# Patient Record
Sex: MALE | Race: WHITE | NOT HISPANIC OR LATINO | Employment: OTHER | URBAN - METROPOLITAN AREA
[De-identification: names, ages, dates, MRNs, and addresses within clinical notes are randomized per-mention and may not be internally consistent; named-entity substitution may affect disease eponyms.]

---

## 2017-12-19 ENCOUNTER — APPOINTMENT (OUTPATIENT)
Dept: PREADMISSION TESTING | Facility: HOSPITAL | Age: 62
End: 2017-12-19

## 2017-12-19 LAB
ANION GAP SERPL CALCULATED.3IONS-SCNC: 8 MMOL/L (ref 3–11)
BUN BLD-MCNC: 14 MG/DL (ref 9–23)
BUN/CREAT SERPL: 11.7 (ref 7–25)
CALCIUM SPEC-SCNC: 9.2 MG/DL (ref 8.7–10.4)
CHLORIDE SERPL-SCNC: 106 MMOL/L (ref 99–109)
CO2 SERPL-SCNC: 31 MMOL/L (ref 20–31)
CREAT BLD-MCNC: 1.2 MG/DL (ref 0.6–1.3)
DEPRECATED RDW RBC AUTO: 44 FL (ref 37–54)
ERYTHROCYTE [DISTWIDTH] IN BLOOD BY AUTOMATED COUNT: 12.7 % (ref 11.3–14.5)
GFR SERPL CREATININE-BSD FRML MDRD: 61 ML/MIN/1.73
GLUCOSE BLD-MCNC: 100 MG/DL (ref 70–100)
HCT VFR BLD AUTO: 42.9 % (ref 38.9–50.9)
HGB BLD-MCNC: 14.4 G/DL (ref 13.1–17.5)
MCH RBC QN AUTO: 31.6 PG (ref 27–31)
MCHC RBC AUTO-ENTMCNC: 33.6 G/DL (ref 32–36)
MCV RBC AUTO: 94.3 FL (ref 80–99)
PLATELET # BLD AUTO: 217 10*3/MM3 (ref 150–450)
PMV BLD AUTO: 10.2 FL (ref 6–12)
POTASSIUM BLD-SCNC: 4.3 MMOL/L (ref 3.5–5.5)
RBC # BLD AUTO: 4.55 10*6/MM3 (ref 4.2–5.76)
SODIUM BLD-SCNC: 145 MMOL/L (ref 132–146)
WBC NRBC COR # BLD: 5.6 10*3/MM3 (ref 3.5–10.8)

## 2017-12-19 PROCEDURE — 93005 ELECTROCARDIOGRAM TRACING: CPT

## 2017-12-19 PROCEDURE — 93010 ELECTROCARDIOGRAM REPORT: CPT | Performed by: INTERNAL MEDICINE

## 2017-12-19 PROCEDURE — 36415 COLL VENOUS BLD VENIPUNCTURE: CPT

## 2017-12-19 PROCEDURE — 85027 COMPLETE CBC AUTOMATED: CPT | Performed by: SURGERY

## 2017-12-19 PROCEDURE — 80048 BASIC METABOLIC PNL TOTAL CA: CPT | Performed by: SURGERY

## 2019-08-05 ENCOUNTER — CONSULT (OUTPATIENT)
Dept: SLEEP MEDICINE | Facility: HOSPITAL | Age: 64
End: 2019-08-05

## 2019-08-05 VITALS
HEIGHT: 74 IN | BODY MASS INDEX: 33.65 KG/M2 | SYSTOLIC BLOOD PRESSURE: 151 MMHG | WEIGHT: 262.2 LBS | OXYGEN SATURATION: 94 % | DIASTOLIC BLOOD PRESSURE: 74 MMHG | HEART RATE: 51 BPM

## 2019-08-05 DIAGNOSIS — F51.12 BEHAVIORALLY INDUCED INSUFFICIENT SLEEP SYNDROME: ICD-10-CM

## 2019-08-05 DIAGNOSIS — G47.10 HYPERSOMNOLENCE: Primary | ICD-10-CM

## 2019-08-05 DIAGNOSIS — E66.09 CLASS 1 OBESITY DUE TO EXCESS CALORIES WITH SERIOUS COMORBIDITY AND BODY MASS INDEX (BMI) OF 33.0 TO 33.9 IN ADULT: ICD-10-CM

## 2019-08-05 DIAGNOSIS — R06.83 SNORING: ICD-10-CM

## 2019-08-05 PROBLEM — I10 ESSENTIAL HYPERTENSION: Status: ACTIVE | Noted: 2019-08-05

## 2019-08-05 PROBLEM — I49.3 PVC (PREMATURE VENTRICULAR CONTRACTION): Status: ACTIVE | Noted: 2019-08-05

## 2019-08-05 PROCEDURE — 99204 OFFICE O/P NEW MOD 45 MIN: CPT | Performed by: INTERNAL MEDICINE

## 2019-08-05 RX ORDER — FLECAINIDE ACETATE 150 MG/1
75 TABLET ORAL 2 TIMES DAILY
Refills: 0 | COMMUNITY
Start: 2019-06-04

## 2019-08-05 RX ORDER — SIMVASTATIN 40 MG
40 TABLET ORAL DAILY
Refills: 1 | COMMUNITY
Start: 2019-07-04

## 2019-08-05 RX ORDER — DICLOFENAC SODIUM 75 MG/1
75 TABLET, DELAYED RELEASE ORAL 2 TIMES DAILY
Refills: 1 | COMMUNITY
Start: 2019-06-17 | End: 2020-11-18

## 2019-08-05 RX ORDER — METOPROLOL TARTRATE 100 MG/1
TABLET ORAL
Refills: 1 | COMMUNITY
Start: 2019-06-17

## 2019-08-05 NOTE — PROGRESS NOTES
New Sleep Patient Office Visit      Patient Name: Jonny Kim    Referring Physician: Missael Charles MD    Chief Complaint:    Chief Complaint   Patient presents with   • Sleeping Problem, snoring, excessive sleepiness       History of Present Illness: Jonny Kim is a 63 y.o. male who is here today to establish care with Sleep Medicine with history of snoring, witnessed apneas and excessive sleepiness    [place] Scale: 15/24    Estimated average amount of sleep per night: 5  Number of times  he wakes up at night: 3  Difficulty falling back asleep: no  It usually takes 10 minutes to go to sleep.  He feels sleepy upon waking up: yes  Rotating or night shift work: no    Drowsiness/Sleepiness:  He exhibits the following:  excessive daytime sleepiness  excessive daytime fatigue  falls asleep during times of the day when he is quiet  difficulty driving due to sleepiness  sleepy even while on vacation    Snoring/Breathing:  He exhibits the following:  loud snoring  awoken with dry mouth  trouble breathing through nose at night    Reflux:  He describes the following:  eats 3 meals daily     Narcolepsy:  No symptoms suggestive.     RLS/PLMs:  none    Insomnia:  He describes the following:  restless sleep    Parasomnia:  none    Weight:  Weight change in the last year:None, stable    Subjective      Pleasant 63-year-old male presenting here for initial evaluation.  Patient has past medical history of hypertension and premature ventricular contractions for which she is on medications.  He states that medications are working and things are under good control for him.  His blood pressure however is somewhat elevated today.  He tells me he is having problem with the sleep for long time.  He snores loudly, stops breathing at night according to wife and wake up restless and not restful sleep overnight.  It has been going on for long time and finally patient decided to undergo further evaluation.  Does wake up with a dry  mouth in the morning.  Goes to bed around 1030 and wake up time is around 430.  Does not take long to fall asleep but does wake up 3 times to go to the restroom.  Wakes up with dry mouth and has to drink water.  Denies any morning headaches however.  He goes to work very early and states that sometimes when he is driving to work or coming back from work he finds himself nodding off.  He brings down the temperature in the car and then he can be more awake.  Patient denies any restless leg syndrome night.  Denies any REM behavior disorder phenomena kicking or hitting during sleep or acting out his dreams.  Denies any symptoms suggestive of narcolepsy.  Does have occasional edema.  No nocturnal heartburn symptoms.  States that snoring is worse in supine sleep.  Patient denies any caffeine intake during the daytime either.  Denies any orthopnea or PND symptoms.  No recent weight gain or weight loss either.  Denies any accidents or near misses while driving.  Strongly counseled to be safe on the road and if he is sleepy then will over take a nap before driving on.  He verbalized understanding.  Patient does feel quite fatigued and sleepy and sometimes catches himself napping at work.  States that when he is unable to finish his work that increases his anxiety.      Review of Systems:   Review of Systems   Constitutional: Positive for fatigue. Negative for appetite change, chills, diaphoresis, fever, unexpected weight gain and unexpected weight loss.   HENT: Positive for congestion (occasional) and tinnitus. Negative for sinus pressure, trouble swallowing and voice change.    Eyes: Negative.    Respiratory: Negative.    Cardiovascular: Positive for palpitations. Negative for chest pain and leg swelling.   Endocrine: Negative.    Musculoskeletal: Positive for myalgias. Negative for arthralgias, back pain and joint swelling.   Skin: Negative.    Neurological: Negative.    Psychiatric/Behavioral: Negative.    All other  "systems reviewed and are negative.      Past Medical History:   Past Medical History:   Diagnosis Date   • Hyperlipidemia    • Hypertension    • PVC (premature ventricular contraction)        Past Surgical History:   Past Surgical History:   Procedure Laterality Date   • HERNIA REPAIR         Family History:   Family History   Problem Relation Age of Onset   • Hypertension Father    • Cancer Father         colon   • Hypertension Brother    • Obesity Brother    • Hypertension Brother    • Obesity Brother        Social History:   Social History     Socioeconomic History   • Marital status:      Spouse name: Not on file   • Number of children: Not on file   • Years of education: Not on file   • Highest education level: Not on file   Tobacco Use   • Smoking status: Never Smoker   • Smokeless tobacco: Never Used   Substance and Sexual Activity   • Alcohol use: No     Frequency: Never   • Drug use: No       Medications:     Current Outpatient Medications:   •  diclofenac (VOLTAREN) 75 MG EC tablet, Take 75 mg by mouth 2 (Two) Times a Day., Disp: , Rfl: 1  •  flecainide (TAMBOCOR) 150 MG tablet, Take 75 mg by mouth 2 (Two) Times a Day., Disp: , Rfl: 0  •  metoprolol tartrate (LOPRESSOR) 100 MG tablet, TAKE 1 TABLET BY MOUTH TWICE A DAY TAKE WITH LOSARTAN, Disp: , Rfl: 1  •  simvastatin (ZOCOR) 40 MG tablet, Take 40 mg by mouth Daily., Disp: , Rfl: 1    Allergies:   Allergies   Allergen Reactions   • [place] Flavor [Flavoring Agent] Rash   • Poison Oak Extract Rash       Objective     Physical Exam:  Vital Signs:   Vitals:    08/05/19 0823   BP: 151/74   Pulse: 51   SpO2: 94%   Weight: 119 kg (262 lb 3.2 oz)   Height: 188 cm (74\")       Physical Exam   Constitutional: He is oriented to person, place, and time. He appears well-developed and well-nourished. No distress.   HENT:   Head: Normocephalic and atraumatic.   Nose: Nose normal.   Mouth/Throat: Oropharynx is clear and moist. No oropharyngeal exudate.   Thrush: " None  Mallampati Score: 4  Redundant soft palate.     Eyes: EOM are normal. Pupils are equal, round, and reactive to light. Right eye exhibits no discharge. Left eye exhibits no discharge. No scleral icterus.   Neck: Neck supple. No tracheal deviation present. No thyromegaly present.   Thick neck   Cardiovascular: Normal rate, regular rhythm and normal heart sounds. Exam reveals no friction rub.   No murmur heard.  Pulmonary/Chest: Effort normal and breath sounds normal. No stridor. No respiratory distress. He has no wheezes. He has no rales.   Abdominal: Soft. He exhibits no distension. There is no tenderness. There is no guarding.   Musculoskeletal: He exhibits edema (trace). He exhibits no tenderness.   Clubbing: None   Lymphadenopathy:     He has no cervical adenopathy.   Neurological: He is alert and oriented to person, place, and time. No cranial nerve deficit. Coordination normal.   Skin: He is not diaphoretic.   Psychiatric: He has a normal mood and affect. His behavior is normal. Judgment and thought content normal.   Nursing note and vitals reviewed.      Results Review:   Tests ordered. TSH, Sleep study    Assessment / Plan      Assessment:   Problem List Items Addressed This Visit     None      Visit Diagnoses     Hypersomnolence    -  Primary    Relevant Orders    TSH    T4, Free    Polysomnography 4 or More Parameters    Behaviorally induced insufficient sleep syndrome        Class 1 obesity due to excess calories with serious comorbidity and body mass index (BMI) of 33.0 to 33.9 in adult        Snoring                Plan:     1.  Patient with multiple risk factor for underlying sleep disordered breathing including male gender, obesity, upper airway abnormalities with Mallampati 4 airway and redundant soft palate with thick neck.  Mother risk factors are history of hypertension and cardiac arrhythmia.  Discussed that we need to proceed with a sleep study to further evaluate sleep disordered breathing.   Pathophysiology of sleep apnea reviewed with him.  Treatment options reviewed in detail if positive sleep study.  Side effects of untreated sleep apnea are discussed including further cardiac problems, worsening fatigue and hypersomnolence and neurological side effects.  Patient is amenable to undergoing sleep study to further diagnose his condition.  If sleep study is negative then will consider oral appliance therapy for his snoring as he still has pretty loud snoring which is affecting his personal and social life.    2.  Patient is significantly fatigued and unable to lose weight.  Will check thyroid studies to make sure not dealing with any occult hypothyroidism.  Implications of that reviewed with the patient.  He is due to get blood work through his primary care and we will provide him the orders for the test to be added to the labs to be drawn.    3.  Increasing activity and weight loss counseled.  Patient admits that he has attempted to lose weight but it has been hard.  But he understands the importance of that.    4.  Nocturia could be due to untreated sleep disordered breathing or prostate issues.  He does get annual physical with his primary care and will defer further recheck of prostate per them.    5.  Patient does have behaviorally induced insufficient sleep.  Advised to increase his sleep time.  States that he and his wife sleep cycle is little different and he tries to stay late to spend time with her later in the day.  Advised that he should try to get at least 7 hours of sleep at night to improve his daytime fatigue and work productivity.  He verbalized understanding.    Thank you for allowing me to participate in the care of this pleasant patient.  We will follow him closely after his sleep study and discuss further options with him at that point.      Follow Up:   No Follow-up on file.    Discussed plan of care in detail with patient today. Patient verbally understands and agrees. I have  spent and counseled for approximately 30 minutes face to face, with greater than 50 % of the time counseling on good sleep hygiene, increasing sleep time, further testing and treatment options for his sleep disorder, increasing activity and weight loss out of 50-minute clinic visit.         Sam Alexandra MD  Pulmonary Critical Care and Sleep Medicine      Please note that portions of this note may have been completed with a voice recognition program. Efforts were made to edit the dictations, but occasionally words are mistranscribed.

## 2019-09-06 ENCOUNTER — HOSPITAL ENCOUNTER (OUTPATIENT)
Dept: SLEEP MEDICINE | Facility: HOSPITAL | Age: 64
Discharge: HOME | End: 2019-09-06
Admitting: INTERNAL MEDICINE

## 2019-09-06 VITALS
BODY MASS INDEX: 34.07 KG/M2 | HEART RATE: 56 BPM | SYSTOLIC BLOOD PRESSURE: 157 MMHG | WEIGHT: 265.43 LBS | DIASTOLIC BLOOD PRESSURE: 68 MMHG | OXYGEN SATURATION: 98 % | HEIGHT: 74 IN

## 2019-09-06 PROCEDURE — 95811 POLYSOM 6/>YRS CPAP 4/> PARM: CPT

## 2019-09-06 PROCEDURE — 95811 POLYSOM 6/>YRS CPAP 4/> PARM: CPT | Performed by: INTERNAL MEDICINE

## 2019-09-13 DIAGNOSIS — I10 ESSENTIAL HYPERTENSION: ICD-10-CM

## 2019-09-13 DIAGNOSIS — G47.33 MODERATE OBSTRUCTIVE SLEEP APNEA: Primary | ICD-10-CM

## 2019-09-23 ENCOUNTER — OFFICE VISIT (OUTPATIENT)
Dept: SLEEP MEDICINE | Facility: HOSPITAL | Age: 64
End: 2019-09-23

## 2019-09-23 VITALS
OXYGEN SATURATION: 97 % | HEART RATE: 57 BPM | DIASTOLIC BLOOD PRESSURE: 73 MMHG | HEIGHT: 74 IN | SYSTOLIC BLOOD PRESSURE: 144 MMHG | BODY MASS INDEX: 34.08 KG/M2 | WEIGHT: 265.6 LBS

## 2019-09-23 DIAGNOSIS — G47.33 MODERATE OBSTRUCTIVE SLEEP APNEA: Primary | ICD-10-CM

## 2019-09-23 PROCEDURE — 99213 OFFICE O/P EST LOW 20 MIN: CPT | Performed by: NURSE PRACTITIONER

## 2019-09-23 NOTE — PROGRESS NOTES
Chief Complaint:   Chief Complaint   Patient presents with   • Follow-up       HPI:    Jonny Kim is a 63 y.o. male here for follow-up of sleep study results.  Patient was last seen 8/5/2019.  Patient had history of apneas, snoring, and excessive daytime sleepiness.  At this time patient is sleeping 7 hours nightly and is not rested upon awakening.  Patient has an [place] score of 15/24.  Patient has sleep study 9/6/2019 that did show moderate obstructive sleep apnea.  We have discussed in detail the consequences of on treated sleep apnea as well as different therapies available to him.  Also he requested to speak to the sleep technician to explain at least 10-20 pages of his sleep study algorithms.  After this is been explained to him he did decide to initiate CPAP therapy.        Current medications are:   Current Outpatient Medications:   •  diclofenac (VOLTAREN) 75 MG EC tablet, Take 75 mg by mouth 2 (Two) Times a Day., Disp: , Rfl: 1  •  flecainide (TAMBOCOR) 150 MG tablet, Take 75 mg by mouth 2 (Two) Times a Day., Disp: , Rfl: 0  •  metoprolol tartrate (LOPRESSOR) 100 MG tablet, TAKE 1 TABLET BY MOUTH TWICE A DAY TAKE WITH LOSARTAN, Disp: , Rfl: 1  •  simvastatin (ZOCOR) 40 MG tablet, Take 40 mg by mouth Daily., Disp: , Rfl: 1.      The patient's relevant past medical, surgical, family and social history were reviewed and updated in Epic as appropriate.       Review of Systems   Constitutional: Positive for fatigue.   HENT: Positive for tinnitus.    Respiratory: Positive for apnea.    Cardiovascular: Positive for palpitations.   Musculoskeletal: Positive for myalgias.   Psychiatric/Behavioral: Positive for sleep disturbance.   All other systems reviewed and are negative.        Objective:    Physical Exam   Constitutional: He is oriented to person, place, and time. He appears well-developed and well-nourished.   HENT:   Head: Normocephalic and atraumatic.   Mouth/Throat: Oropharynx is clear and moist.    Mallampati 4 anatomy   Eyes: Conjunctivae are normal.   Neck: Neck supple. No thyromegaly present.   Cardiovascular: Normal rate and regular rhythm.   Pulmonary/Chest: Effort normal and breath sounds normal.   Lymphadenopathy:     He has no cervical adenopathy.   Neurological: He is alert and oriented to person, place, and time.   Skin: Skin is warm and dry.   Psychiatric: He has a normal mood and affect. His behavior is normal. Judgment and thought content normal.   Nursing note and vitals reviewed.        ASSESSMENT/PLAN    Jonny was seen today for follow-up.    Diagnoses and all orders for this visit:    Moderate obstructive sleep apnea            1. Counseled patient regarding multimodal approach with healthy nutrition, healthy sleep, regular physical activity, social activities, counseling, and medications. Encouraged to practice lateral sleep position. Avoid alcohol and sedatives close to bedtime.  2. Order to initiate CPAP therapy faxed to DME of patient's choosing.  I will see patient back in 31 to 90 days to reassess.    I have reviewed the results of my evaluation and impression and discussed my recommendations in detail with the patient.  Time spent with patient greater than 30 minutes 50% being in counseling.    Signed by  SHAUNA Dill    September 23, 2019      CC: Missael Charles MD          No ref. provider found

## 2019-11-18 ENCOUNTER — OFFICE VISIT (OUTPATIENT)
Dept: SLEEP MEDICINE | Facility: HOSPITAL | Age: 64
End: 2019-11-18

## 2019-11-18 VITALS
SYSTOLIC BLOOD PRESSURE: 166 MMHG | WEIGHT: 266.4 LBS | HEART RATE: 52 BPM | HEIGHT: 74 IN | BODY MASS INDEX: 34.19 KG/M2 | DIASTOLIC BLOOD PRESSURE: 79 MMHG | OXYGEN SATURATION: 98 %

## 2019-11-18 DIAGNOSIS — G47.33 MODERATE OBSTRUCTIVE SLEEP APNEA: Primary | ICD-10-CM

## 2019-11-18 PROCEDURE — 99212 OFFICE O/P EST SF 10 MIN: CPT | Performed by: NURSE PRACTITIONER

## 2019-11-18 NOTE — PROGRESS NOTES
Chief Complaint:   Chief Complaint   Patient presents with   • Follow-up       HPI:    Jonny Kim is a 63 y.o. male here for follow-up of sleep apnea.  Patient was last seen 9/23/2019 and did decide to initiate CPAP therapy after sleep study 9/6/2019 showed moderate obstructive sleep apnea.  Patient states he is doing well with CPAP therapy.  Patient is sleeping 6 hours nightly and is refreshed upon awakening.  Patient has an [place] score of 13/24.  Patient will sometimes become sleepy throughout the day but states he is 50% better since starting CPAP.  Patient does wish to continue with the use.        Current medications are:   Current Outpatient Medications:   •  diclofenac (VOLTAREN) 75 MG EC tablet, Take 75 mg by mouth 2 (Two) Times a Day., Disp: , Rfl: 1  •  flecainide (TAMBOCOR) 150 MG tablet, Take 75 mg by mouth 2 (Two) Times a Day., Disp: , Rfl: 0  •  metoprolol tartrate (LOPRESSOR) 100 MG tablet, TAKE 1 TABLET BY MOUTH TWICE A DAY TAKE WITH LOSARTAN, Disp: , Rfl: 1  •  simvastatin (ZOCOR) 40 MG tablet, Take 40 mg by mouth Daily., Disp: , Rfl: 1.      The patient's relevant past medical, surgical, family and social history were reviewed and updated in Epic as appropriate.       Review of Systems   HENT: Positive for tinnitus.    Eyes: Positive for visual disturbance.   Respiratory: Positive for apnea.    Cardiovascular: Positive for palpitations.   Psychiatric/Behavioral: Positive for sleep disturbance.   All other systems reviewed and are negative.        Objective:    Physical Exam   Constitutional: He is oriented to person, place, and time. He appears well-developed and well-nourished.   HENT:   Head: Normocephalic and atraumatic.   Mouth/Throat: Oropharynx is clear and moist.   Class 4 airway   Eyes: Conjunctivae are normal.   Neck: Neck supple. No thyromegaly present.   Cardiovascular: Normal rate and regular rhythm.   Pulmonary/Chest: Effort normal and breath sounds normal.   Lymphadenopathy:      He has no cervical adenopathy.   Neurological: He is alert and oriented to person, place, and time.   Skin: Skin is warm and dry.   Psychiatric: He has a normal mood and affect. His behavior is normal. Judgment and thought content normal.   Nursing note and vitals reviewed.    50/52 days of use.  Greater than 4-hour use 94.2%.  90% pressure 8.5.  AHI 4.2.  Download reviewed with patient.    ASSESSMENT/PLAN    Jonny was seen today for follow-up.    Diagnoses and all orders for this visit:    Moderate obstructive sleep apnea  -     CPAP Therapy            1. Counseled patient regarding multimodal approach with healthy nutrition, healthy sleep, regular physical activity, social activities, counseling, and medications. Encouraged to practice lateral  sleep position. Avoid alcohol and sedatives close to bedtime.  2. Refill supplies x1 year.  Return to clinic 1 year sooner symptoms warrant.    I have reviewed the results of my evaluation and impression and discussed my recommendations in detail with the patient.      Signed by  SHAUNA Dill    November 18, 2019      CC: Missael Charles MD          No ref. provider found

## 2020-11-18 ENCOUNTER — OFFICE VISIT (OUTPATIENT)
Dept: SLEEP MEDICINE | Facility: HOSPITAL | Age: 65
End: 2020-11-18

## 2020-11-18 VITALS
HEIGHT: 74 IN | BODY MASS INDEX: 33.62 KG/M2 | SYSTOLIC BLOOD PRESSURE: 176 MMHG | HEART RATE: 52 BPM | OXYGEN SATURATION: 97 % | DIASTOLIC BLOOD PRESSURE: 76 MMHG | WEIGHT: 262 LBS

## 2020-11-18 DIAGNOSIS — G47.33 OSA (OBSTRUCTIVE SLEEP APNEA): Primary | ICD-10-CM

## 2020-11-18 PROCEDURE — 99212 OFFICE O/P EST SF 10 MIN: CPT | Performed by: NURSE PRACTITIONER

## 2020-11-18 RX ORDER — HYDRALAZINE HYDROCHLORIDE 10 MG/1
10 TABLET, FILM COATED ORAL 3 TIMES DAILY
COMMUNITY

## 2020-11-18 RX ORDER — CITALOPRAM 10 MG/1
10 TABLET ORAL DAILY
COMMUNITY

## 2020-11-18 RX ORDER — CELECOXIB 200 MG/1
200 CAPSULE ORAL DAILY
COMMUNITY
Start: 2020-09-17

## 2020-11-18 RX ORDER — SUCRALFATE 1 G/1
1 TABLET ORAL 4 TIMES DAILY
COMMUNITY

## 2020-11-18 RX ORDER — PANTOPRAZOLE SODIUM 40 MG/1
40 TABLET, DELAYED RELEASE ORAL DAILY
COMMUNITY
Start: 2020-10-06

## 2020-11-18 NOTE — PROGRESS NOTES
Chief Complaint:   Chief Complaint   Patient presents with   • Follow-up       HPI:    Jonny Kim is a 64 y.o. male here for follow-up of sleep apnea.  Patient was last seen 11/18/2019.  Patient states he is doing very well with CPAP therapy.  Patient is sleeping 7 hours nightly and does feel refreshed upon awakening.  Patient will go to sleep within 5 minutes and does get up x1 in the night to use the restroom.  He will not have difficulty going back to sleep.  Patient has an [place] score of 5/24.  Patient has no concerns or complaints regarding CPAP therapy and wishes to continue.        Current medications are:   Current Outpatient Medications:   •  celecoxib (CeleBREX) 200 MG capsule, Take 200 mg by mouth Daily., Disp: , Rfl:   •  citalopram (CeleXA) 10 MG tablet, Take 10 mg by mouth Daily., Disp: , Rfl:   •  flecainide (TAMBOCOR) 150 MG tablet, Take 75 mg by mouth 2 (Two) Times a Day., Disp: , Rfl: 0  •  hydrALAZINE (APRESOLINE) 10 MG tablet, Take 10 mg by mouth 3 (Three) Times a Day., Disp: , Rfl:   •  metoprolol tartrate (LOPRESSOR) 100 MG tablet, TAKE 1 TABLET BY MOUTH TWICE A DAY TAKE WITH LOSARTAN, Disp: , Rfl: 1  •  pantoprazole (PROTONIX) 40 MG EC tablet, Take 40 mg by mouth Daily., Disp: , Rfl:   •  simvastatin (ZOCOR) 40 MG tablet, Take 40 mg by mouth Daily., Disp: , Rfl: 1  •  sucralfate (CARAFATE) 1 g tablet, Take 1 g by mouth 4 (Four) Times a Day., Disp: , Rfl: .      The patient's relevant past medical, surgical, family and social history were reviewed and updated in Epic as appropriate.       Review of Systems   HENT: Positive for tinnitus.    Eyes: Positive for visual disturbance.   Respiratory: Positive for apnea.    Cardiovascular: Positive for palpitations.   Gastrointestinal:        Heartburn   Psychiatric/Behavioral: Positive for dysphoric mood and sleep disturbance. The patient is nervous/anxious.    All other systems reviewed and are negative.        Objective:    Physical  Exam  Constitutional:       Appearance: Normal appearance.   HENT:      Head: Normocephalic and atraumatic.      Mouth/Throat:      Pharynx: Oropharynx is clear.      Comments: Class 4 airway  Eyes:      Conjunctiva/sclera: Conjunctivae normal.      Pupils: Pupils are equal, round, and reactive to light.   Pulmonary:      Effort: Pulmonary effort is normal. No respiratory distress.   Skin:     General: Skin is warm and dry.      Coloration: Skin is not pale.      Findings: No erythema.   Neurological:      Mental Status: He is alert and oriented to person, place, and time.   Psychiatric:         Mood and Affect: Mood normal.         Behavior: Behavior normal.         Thought Content: Thought content normal.         Judgment: Judgment normal.     45/48 days of use  Greater than 4-hour use 91.7  90% pressure 10.0  AHI of 3.4  Settings 5-20.      ASSESSMENT/PLAN    Diagnoses and all orders for this visit:    1. SUSANNE (obstructive sleep apnea) (Primary)  -     CPAP Therapy            1. Counseled patient regarding multimodal approach with healthy nutrition, healthy sleep, regular physical activity, social activities, counseling, and medications. Encouraged to practice  Lateral sleep position. Avoid alcohol and sedatives close to bedtime.  2. Refill supplies x1 year.  Return to clinic 1 year or sooner symptoms warrant.    I have reviewed the results of my evaluation and impression and discussed my recommendations in detail with the patient.      Signed by  SHAUNA Dill    November 18, 2020      CC: Missael Charles MD          No ref. provider found

## 2020-12-06 PROBLEM — M51.37 DEGENERATION OF LUMBAR OR LUMBOSACRAL INTERVERTEBRAL DISC: Status: ACTIVE | Noted: 2020-12-06

## 2020-12-06 PROBLEM — M47.816 SPONDYLOSIS OF LUMBAR REGION WITHOUT MYELOPATHY OR RADICULOPATHY: Status: ACTIVE | Noted: 2020-12-06

## 2020-12-10 ENCOUNTER — HOSPITAL ENCOUNTER (OUTPATIENT)
Dept: GENERAL RADIOLOGY | Facility: HOSPITAL | Age: 65
Discharge: HOME | End: 2020-12-10
Admitting: ANESTHESIOLOGY

## 2020-12-10 ENCOUNTER — OFFICE VISIT (OUTPATIENT)
Dept: PAIN MEDICINE | Facility: CLINIC | Age: 65
End: 2020-12-10

## 2020-12-10 VITALS
SYSTOLIC BLOOD PRESSURE: 160 MMHG | WEIGHT: 265.4 LBS | TEMPERATURE: 97.9 F | BODY MASS INDEX: 34.06 KG/M2 | HEIGHT: 74 IN | RESPIRATION RATE: 14 BRPM | DIASTOLIC BLOOD PRESSURE: 90 MMHG

## 2020-12-10 DIAGNOSIS — G47.33 OSA ON CPAP: ICD-10-CM

## 2020-12-10 DIAGNOSIS — M51.37 DEGENERATION OF LUMBAR OR LUMBOSACRAL INTERVERTEBRAL DISC: ICD-10-CM

## 2020-12-10 DIAGNOSIS — E66.9 MILD OBESITY: ICD-10-CM

## 2020-12-10 DIAGNOSIS — M48.062 LUMBAR STENOSIS WITH NEUROGENIC CLAUDICATION: Primary | ICD-10-CM

## 2020-12-10 DIAGNOSIS — Z99.89 OSA ON CPAP: ICD-10-CM

## 2020-12-10 DIAGNOSIS — M47.816 SPONDYLOSIS OF LUMBAR REGION WITHOUT MYELOPATHY OR RADICULOPATHY: ICD-10-CM

## 2020-12-10 DIAGNOSIS — R53.81 PHYSICAL DECONDITIONING: ICD-10-CM

## 2020-12-10 DIAGNOSIS — M16.11 OSTEOARTHRITIS OF RIGHT HIP, UNSPECIFIED OSTEOARTHRITIS TYPE: ICD-10-CM

## 2020-12-10 DIAGNOSIS — M51.26 LUMBAR DISCOGENIC PAIN SYNDROME: ICD-10-CM

## 2020-12-10 DIAGNOSIS — M48.062 LUMBAR STENOSIS WITH NEUROGENIC CLAUDICATION: ICD-10-CM

## 2020-12-10 PROCEDURE — 72120 X-RAY BEND ONLY L-S SPINE: CPT

## 2020-12-10 PROCEDURE — 99204 OFFICE O/P NEW MOD 45 MIN: CPT | Performed by: ANESTHESIOLOGY

## 2020-12-10 PROCEDURE — 73521 X-RAY EXAM HIPS BI 2 VIEWS: CPT

## 2020-12-10 RX ORDER — IBUPROFEN 600 MG/1
600 TABLET ORAL EVERY 6 HOURS PRN
COMMUNITY

## 2020-12-10 RX ORDER — CHLORZOXAZONE 500 MG/1
500 TABLET ORAL 4 TIMES DAILY PRN
COMMUNITY

## 2020-12-10 RX ORDER — MECLIZINE HCL 12.5 MG/1
12.5 TABLET ORAL 3 TIMES DAILY PRN
COMMUNITY

## 2020-12-10 RX ORDER — HYDROCODONE BITARTRATE AND ACETAMINOPHEN 10; 325 MG/1; MG/1
1 TABLET ORAL EVERY 6 HOURS PRN
COMMUNITY

## 2020-12-10 RX ORDER — LOSARTAN POTASSIUM AND HYDROCHLOROTHIAZIDE 12.5; 5 MG/1; MG/1
1 TABLET ORAL DAILY
COMMUNITY

## 2020-12-10 RX ORDER — ASPIRIN 81 MG/1
81 TABLET, CHEWABLE ORAL DAILY
COMMUNITY

## 2020-12-10 RX ORDER — MONTELUKAST SODIUM 10 MG/1
10 TABLET ORAL NIGHTLY
COMMUNITY

## 2020-12-10 RX ORDER — ASCORBIC ACID 125 MG
TABLET,CHEWABLE ORAL
COMMUNITY
End: 2022-06-27

## 2020-12-10 RX ORDER — DICLOFENAC SODIUM AND MISOPROSTOL 75; 200 MG/1; UG/1
1 TABLET, DELAYED RELEASE ORAL 2 TIMES DAILY
COMMUNITY

## 2020-12-10 RX ORDER — OXYCODONE AND ACETAMINOPHEN 10; 325 MG/1; MG/1
1 TABLET ORAL EVERY 6 HOURS PRN
COMMUNITY

## 2020-12-10 RX ORDER — BUSPIRONE HYDROCHLORIDE 15 MG/1
15 TABLET ORAL 3 TIMES DAILY
COMMUNITY

## 2020-12-10 RX ORDER — MELATONIN
1000 DAILY
COMMUNITY

## 2020-12-22 ENCOUNTER — HOSPITAL ENCOUNTER (OUTPATIENT)
Dept: MRI IMAGING | Facility: HOSPITAL | Age: 65
Discharge: HOME | End: 2020-12-22

## 2020-12-22 ENCOUNTER — HOSPITAL ENCOUNTER (OUTPATIENT)
Dept: NEUROLOGY | Facility: HOSPITAL | Age: 65
Discharge: HOME | End: 2020-12-22

## 2020-12-22 PROCEDURE — 95886 MUSC TEST DONE W/N TEST COMP: CPT

## 2020-12-22 PROCEDURE — 72148 MRI LUMBAR SPINE W/O DYE: CPT

## 2020-12-22 PROCEDURE — 95886 MUSC TEST DONE W/N TEST COMP: CPT | Performed by: PSYCHIATRY & NEUROLOGY

## 2020-12-22 PROCEDURE — 95910 NRV CNDJ TEST 7-8 STUDIES: CPT | Performed by: PSYCHIATRY & NEUROLOGY

## 2020-12-22 PROCEDURE — 95910 NRV CNDJ TEST 7-8 STUDIES: CPT

## 2020-12-22 NOTE — PROGRESS NOTES
[facility] [place]   Electrodiagnostic Laboratory    Nerve Conduction & EMG Report        Patient:  Jonny Kim   Patient ID: [id number]   YOB: 1955  Sex:  male      Exam Physician:  Chino Anaya MD  Refer Physician:  Rio Ivan MD    Electromyogram and Nerve Conduction Velocity Procedure Note    Hx: 65 y.o. right handed male with complaint of pain involving the both lower extremities. Symptoms have been present for several years and were provoked by no clear event. Significant past medical history includes lumbar stenosis.   Family history no family history of nerve or muscle disease.    Exam: Motor power is normal. There is no atrophy. There are no fasciculations. Deep tendon reflexes are present and symmetrical. Sensory exam is normal.      Edx studies of the B LE were performed to evaluate for lumbar radiculopathy.     NCS Examination   For sensory nerve conduction studies, the amplitude is measured peak-to-peak, the latency reported is the distal peak latency, and the conduction velocity, if measured, is determined from onset latencies and is over the forearm.   For motor nerve conduction studies, the amplitude is measured baseline-to-peak, the latency reported is the distal onset latency, the conduction velocity is calculated over the forearm, and the F wave latency is the minimum latency.   Unless otherwise noted, the hand temperature was monitored continuously and remained between 32°C and 36°C during the performance of the NCSs.        Sensory NCS      Nerve / Sites Rec. Site Onset Lat Peak Lat NP Amp PP Amp Segments Distance Velocity     ms ms µV µV  cm m/s   L SURAL - Lat Mall Antidr      Calf Lat Mall NR NR NR NR Calf - Lat Mall 14 NR      Ref.   4.20 5.0  Ref.     R SURAL - Lat Mall Antidr      Calf Lat Mall NR NR NR NR Calf - Lat Mall 14 NR      Ref.   4.20 5.0  Ref.             Motor NCS      Nerve / Sites Rec. Site Lat Amp Seq Amp Segments Dist Velocity     ms  mV %  cm m/s   L COMM PERONEAL - EDB      Ankle EDB 3.15 2.7 100 Ankle - EDB 8       Ref.  6.00 2.0  Ref.        Fib Head EDB 11.80 2.0 74.6 Fib Head - Ankle 36 41.6      Ref.     Ref.  39.0      Knee EDB 13.70 2.3 116 Knee - Fib Head 10 52.6   R COMM PERONEAL - EDB      Ankle EDB 2.95 4.8 100 Ankle - EDB 8       Ref.  6.00 2.0  Ref.        Fib Head EDB 11.35 4.1 84.2 Fib Head - Ankle 32 38.1      Ref.     Ref.  39.0      Knee EDB 13.45 4.3 105 Knee - Fib Head 9 42.9   L TIBIAL (KNEE) - AH      Ankle AH 6.25 6.5 100 Ankle - AH 8       Ref.  6.00 2.5  Ref.        Knee AH 16.35 4.1 62.7 Knee - Ankle 42 41.6      Ref.     Ref.  39.0   R TIBIAL (KNEE) - AH      Ankle AH 5.35 2.3 100 Ankle - AH 8       Ref.  6.00 2.5  Ref.        Knee AH 14.80 1.4 59.6 Knee - Ankle 42 44.4      Ref.     Ref.  39.0               F  Wave      Nerve Fmin    ms   L TIBIAL (KNEE) 63.75   REF 56.00   L COMM PERONEAL 52.55   REF 56.00   R TIBIAL (KNEE) 56.45   REF 56.00   R COMM PERONEAL 60.50   REF 56.00               H Reflex      Nerve H Lat    ms   L TIBIAL (KNEE) - [place]us (S1) NR   Ref 36.00   R TIBIAL (KNEE) - Soleus (S1) 37.90   Ref 36.00       L TIBIAL (KNEE) - Soleus (S1): No Response              EMG Examination   The study was performed with a concentric needle electrode. Fibrillation and fasciculation activity is graded from none (0) to continuous (4+). The configuration and recruitment pattern of motor unit action potentials under voluntary control, if not normal, are described bel      EMG Summary Table     Spontaneous MUAP Recruitment    IA Fib PSW Fasc H.F. Amp Dur. PPP Pattern   L. ILIOPSOAS N None None None None N N N N   R. ILIOPSOAS N None None None None N N N N   L. GLUTEUS MAX N None None None None N N N N   R. GLUTEUS MAX N None None None None N N N N   L. QUADRICEPS N None None None None N N N N   R. QUADRICEPS N None None None None N N N N   L. BIC FEM (L HEAD) N None None None None N N N N   R. BIC FEM (L HEAD) N None  None None None N N N N   L. GASTROCN (LAT) N None None None None N N N N   R. GASTROCN (LAT) N None None None None N N N N   L. TIB ANTERIOR N None None None None N N N N   R. TIB ANTERIOR N None None None None N N N N         · Left common peroneal motor responses and F wave were normal  · Left tibial motor responses revealed prolonged distal latency and F wave were prolonged  · Left sural sensory responses were absent  · Left H-reflex responses were absent  · Right common peroneal motor responses revealed decreased conduction velocity from below fibular head to ankley and F wave were prolonged  · Right tibial motor responses revealed decreased amplitudes and F wave were prolonged  · Right sural sensory responses were absent  · Right H-reflex responses were prolonged  · Needle examination with a concentric needle electrode of selected muscles of the bilateral lower extremities were normal         Conclusion: This study showed neurophysiologic evidence of a distal symmetrical sensorimotor polyneuropathy with features of demyelination.            Instrument used:  Teca Synergy        Performed by:          [facility] NEURODIAGNOSTIC ROOM 3

## 2021-03-24 ENCOUNTER — IMMUNIZATION (OUTPATIENT)
Dept: VACCINE CLINIC | Facility: HOSPITAL | Age: 66
End: 2021-03-24

## 2021-03-24 PROCEDURE — 0001A: CPT | Performed by: INTERNAL MEDICINE

## 2021-03-24 PROCEDURE — 91300 HC SARSCOV02 VAC 30MCG/0.3ML IM: CPT | Performed by: INTERNAL MEDICINE

## 2021-04-14 ENCOUNTER — IMMUNIZATION (OUTPATIENT)
Dept: VACCINE CLINIC | Facility: HOSPITAL | Age: 66
End: 2021-04-14

## 2021-04-14 PROCEDURE — 0002A: CPT | Performed by: INTERNAL MEDICINE

## 2021-04-14 PROCEDURE — 91300 HC SARSCOV02 VAC 30MCG/0.3ML IM: CPT | Performed by: INTERNAL MEDICINE

## 2022-06-27 ENCOUNTER — OFFICE VISIT (OUTPATIENT)
Dept: SLEEP MEDICINE | Facility: HOSPITAL | Age: 67
End: 2022-06-27

## 2022-06-27 VITALS
WEIGHT: 269 LBS | BODY MASS INDEX: 35.65 KG/M2 | HEIGHT: 73 IN | SYSTOLIC BLOOD PRESSURE: 117 MMHG | OXYGEN SATURATION: 97 % | DIASTOLIC BLOOD PRESSURE: 62 MMHG | HEART RATE: 53 BPM

## 2022-06-27 DIAGNOSIS — G47.33 OSA (OBSTRUCTIVE SLEEP APNEA): Primary | ICD-10-CM

## 2022-06-27 PROCEDURE — 99213 OFFICE O/P EST LOW 20 MIN: CPT | Performed by: NURSE PRACTITIONER

## 2022-06-27 RX ORDER — NIFEDIPINE 60 MG/1
TABLET, EXTENDED RELEASE ORAL
COMMUNITY
Start: 2022-04-13

## 2022-06-27 RX ORDER — ESCITALOPRAM OXALATE 20 MG/1
TABLET ORAL
COMMUNITY
Start: 2022-06-20

## 2022-06-27 RX ORDER — TADALAFIL 10 MG/1
10 TABLET ORAL DAILY PRN
COMMUNITY

## 2022-06-27 NOTE — PROGRESS NOTES
Chief Complaint:   Chief Complaint   Patient presents with   • Follow-up       HPI:    Jonny Kim is a 66 y.o. male here for follow-up of sleep apnea.  Patient was last seen 11/18/2020.  Patient is sleeping 6 hours nightly and does feel rested upon awakening.  Patient goes to sleep within 5 minutes and will get up x1.  Patient has an Elbing score of 13/24.  Patient states he does drive back and forth from [place] frequently to help out a family member.  He has started biking sometimes getting 40 miles at a time.  He is trying to lose weight and increase overall health.  He has started recently to have trouble with word finding but this is only occurring intermittently and is now taking Lexapro for anxiety related to this.  He has no other complaints or concerns, you feels CPAP is beneficial, he will continue therapy.        Current medications are:   Current Outpatient Medications:   •  aspirin 81 MG chewable tablet, Chew 81 mg Daily., Disp: , Rfl:   •  celecoxib (CeleBREX) 200 MG capsule, Take 200 mg by mouth Daily., Disp: , Rfl:   •  cholecalciferol (VITAMIN D3) 25 MCG (1000 UT) tablet, Take 1,000 Units by mouth Daily., Disp: , Rfl:   •  citalopram (CeleXA) 10 MG tablet, Take 10 mg by mouth Daily., Disp: , Rfl:   •  escitalopram (LEXAPRO) 20 MG tablet, , Disp: , Rfl:   •  flecainide (TAMBOCOR) 150 MG tablet, Take 75 mg by mouth 2 (Two) Times a Day., Disp: , Rfl: 0  •  hydrALAZINE (APRESOLINE) 10 MG tablet, Take 10 mg by mouth 3 (Three) Times a Day., Disp: , Rfl:   •  HYDROcodone-acetaminophen (NORCO)  MG per tablet, Take 1 tablet by mouth Every 6 (Six) Hours As Needed for Moderate Pain ., Disp: , Rfl:   •  metoprolol tartrate (LOPRESSOR) 100 MG tablet, TAKE 1 TABLET BY MOUTH TWICE A DAY TAKE WITH LOSARTAN, Disp: , Rfl: 1  •  NIFEdipine XL (PROCARDIA XL) 60 MG 24 hr tablet, , Disp: , Rfl:   •  pantoprazole (PROTONIX) 40 MG EC tablet, Take 40 mg by mouth Daily., Disp: , Rfl:   •  simvastatin (ZOCOR)  40 MG tablet, Take 40 mg by mouth Daily., Disp: , Rfl: 1  •  tadalafil (Cialis) 10 MG tablet, Take 10 mg by mouth Daily As Needed for Erectile Dysfunction., Disp: , Rfl:   •  busPIRone (BUSPAR) 15 MG tablet, Take 15 mg by mouth 3 (Three) Times a Day., Disp: , Rfl:   •  chlorzoxazone (PARAFON FORTE) 500 MG tablet, Take 500 mg by mouth 4 (Four) Times a Day As Needed for Muscle Spasms., Disp: , Rfl:   •  diclofenac-miSOPROStol (ARTHROTEC 75) 75-0.2 MG EC tablet, Take 1 tablet by mouth 2 (Two) Times a Day., Disp: , Rfl:   •  ibuprofen (ADVIL,MOTRIN) 600 MG tablet, Take 600 mg by mouth Every 6 (Six) Hours As Needed for Mild Pain ., Disp: , Rfl:   •  losartan-hydrochlorothiazide (HYZAAR) 50-12.5 MG per tablet, Take 1 tablet by mouth Daily., Disp: , Rfl:   •  meclizine (ANTIVERT) 12.5 MG tablet, Take 12.5 mg by mouth 3 (Three) Times a Day As Needed for Dizziness., Disp: , Rfl:   •  montelukast (SINGULAIR) 10 MG tablet, Take 10 mg by mouth Every Night., Disp: , Rfl:   •  oxyCODONE-acetaminophen (PERCOCET)  MG per tablet, Take 1 tablet by mouth Every 6 (Six) Hours As Needed for Moderate Pain ., Disp: , Rfl:   •  sucralfate (CARAFATE) 1 g tablet, Take 1 g by mouth 4 (Four) Times a Day., Disp: , Rfl: .      The patient's relevant past medical, surgical, family and social history were reviewed and updated in Epic as appropriate.       Review of Systems   HENT: Positive for tinnitus.    Eyes: Positive for visual disturbance.   Respiratory: Positive for apnea.    Cardiovascular: Positive for palpitations.   Gastrointestinal:        Heartburn   Musculoskeletal: Positive for arthralgias and back pain.   Allergic/Immunologic: Positive for environmental allergies.   Neurological:        Mild difficulty with word finding   Psychiatric/Behavioral: Positive for dysphoric mood and sleep disturbance. The patient is nervous/anxious.    All other systems reviewed and are negative.        Objective:    Physical Exam  Constitutional:   "     Appearance: Normal appearance.   HENT:      Head: Normocephalic and atraumatic.      Mouth/Throat:      Comments: Class 4 airway  Cardiovascular:      Rate and Rhythm: Regular rhythm. Bradycardia present.   Pulmonary:      Effort: Pulmonary effort is normal.      Breath sounds: Normal breath sounds.   Skin:     General: Skin is warm and dry.   Neurological:      Mental Status: He is alert and oriented to person, place, and time.   Psychiatric:         Mood and Affect: Mood normal.         Behavior: Behavior normal.         Thought Content: Thought content normal.         Judgment: Judgment normal.     89/90 days of use  Greater than 4-hour use 94.4  90% pressure 11.0  AHI of 3.5  Settings 5-20    /62   Pulse 53   Ht 185.4 cm (73\")   Wt 122 kg (269 lb)   SpO2 97%   BMI 35.49 kg/m²     ASSESSMENT/PLAN    Diagnoses and all orders for this visit:    1. SUSANNE (obstructive sleep apnea) (Primary)  -     PAP Therapy            1. Counseled patient regarding multimodal approach with healthy nutrition, healthy sleep, regular physical activity, social activities, counseling, and medications. Encouraged to practice lateral sleep position. Avoid alcohol and sedatives close to bedtime.  2. Refill supplies x1 year.  Return to clinic 1 year or sooner if symptoms warrant.    I have reviewed the results of my evaluation and impression and discussed my recommendations in detail with the patient.      Signed by  SHAUNA Dill    June 27, 2022      CC: Missael Charles MD          No ref. provider found      "

## 2023-04-27 ENCOUNTER — TELEPHONE (OUTPATIENT)
Dept: FAMILY MEDICINE CLINIC | Age: 68
End: 2023-04-27

## 2023-04-27 DIAGNOSIS — K21.00 GASTROESOPHAGEAL REFLUX DISEASE WITH ESOPHAGITIS WITHOUT HEMORRHAGE: Primary | ICD-10-CM

## 2023-04-27 DIAGNOSIS — M15.9 GENERALIZED OA: ICD-10-CM

## 2023-04-27 RX ORDER — CELECOXIB 200 MG/1
200 CAPSULE ORAL DAILY
Qty: 30 CAPSULE | Refills: 3 | Status: SHIPPED | OUTPATIENT
Start: 2023-04-27 | End: 2023-07-26

## 2023-04-27 RX ORDER — PANTOPRAZOLE SODIUM 40 MG/1
40 TABLET, DELAYED RELEASE ORAL DAILY
Qty: 30 TABLET | Refills: 3 | Status: SHIPPED | OUTPATIENT
Start: 2023-04-27

## 2023-04-27 NOTE — TELEPHONE ENCOUNTER
This is a new patient who is scheduled to see NP Collette in August. He is asking for a refill on his Protonix & Celebrex in the meantime. Please advise and pend new Rx's if appropriate. For Pharmacy Admin Tracking Only    Program: Medication Refill  CPA in place:   Recommendation Provided To:    Intervention Detail: New Rx: 3, reason: Patient Preference  Intervention Accepted By:   Emerson Mckoy Closed?:   Time Spent (min): 5

## 2023-04-28 DIAGNOSIS — I10 ESSENTIAL HYPERTENSION: Primary | ICD-10-CM

## 2023-04-28 RX ORDER — NIFEDIPINE 60 MG/1
60 TABLET, EXTENDED RELEASE ORAL DAILY
Qty: 30 TABLET | Refills: 3 | Status: SHIPPED | OUTPATIENT
Start: 2023-04-28

## 2023-04-28 NOTE — TELEPHONE ENCOUNTER
Patient is also requesting a refill on Nifedipine ER 60mg (1 tab qhs). Please advise and pend new Rx if appropriate.

## 2023-08-11 RX ORDER — PANTOPRAZOLE SODIUM 40 MG/1
TABLET, DELAYED RELEASE ORAL
Qty: 30 TABLET | OUTPATIENT
Start: 2023-08-11

## 2023-08-11 RX ORDER — CELECOXIB 200 MG/1
CAPSULE ORAL
Qty: 30 CAPSULE | OUTPATIENT
Start: 2023-08-11

## 2023-09-01 RX ORDER — NIFEDIPINE 60 MG/1
TABLET, FILM COATED, EXTENDED RELEASE ORAL
Qty: 90 TABLET | OUTPATIENT
Start: 2023-09-01

## 2023-09-01 NOTE — TELEPHONE ENCOUNTER
For Pharmacy Admin Tracking Only    Program: Medication Refill  CPA in place:    Recommendation Provided To:    Intervention Detail: New Rx: 1, reason: Patient Preference  Intervention Accepted By:   Ankur Guo Closed?:    Time Spent (min): 5

## 2023-10-16 ENCOUNTER — HOSPITAL ENCOUNTER (OUTPATIENT)
Facility: HOSPITAL | Age: 68
Discharge: HOME | End: 2023-10-19
Payer: MEDICARE

## 2023-10-16 DIAGNOSIS — M48.062 SPINAL STENOSIS OF LUMBAR REGION WITH NEUROGENIC CLAUDICATION: ICD-10-CM

## 2023-10-16 DIAGNOSIS — M54.50 LUMBAR BACK PAIN: ICD-10-CM

## 2023-10-16 DIAGNOSIS — G89.29 CHRONIC BILATERAL LOW BACK PAIN WITH LEFT-SIDED SCIATICA: ICD-10-CM

## 2023-10-16 DIAGNOSIS — M54.42 CHRONIC BILATERAL LOW BACK PAIN WITH LEFT-SIDED SCIATICA: ICD-10-CM

## 2023-10-16 PROCEDURE — 72148 MRI LUMBAR SPINE W/O DYE: CPT

## 2023-11-09 NOTE — PATIENT INSTRUCTIONS
Sleep Apnea  Sleep apnea affects breathing during sleep. It causes breathing to stop for a short time or to become shallow. It can also increase the risk of:  · Heart attack.  · Stroke.  · Being very overweight (obese).  · Diabetes.  · Heart failure.  · Irregular heartbeat.  The goal of treatment is to help you breathe normally again.  What are the causes?  There are three kinds of sleep apnea:  · Obstructive sleep apnea. This is caused by a blocked or collapsed airway.  · Central sleep apnea. This happens when the brain does not send the right signals to the muscles that control breathing.  · Mixed sleep apnea. This is a combination of obstructive and central sleep apnea.  The most common cause of this condition is a collapsed or blocked airway. This can happen if:  · Your throat muscles are too relaxed.  · Your tongue and tonsils are too large.  · You are overweight.  · Your airway is too small.  What increases the risk?  · Being overweight.  · Smoking.  · Having a small airway.  · Being older.  · Being male.  · Drinking alcohol.  · Taking medicines to calm yourself (sedatives or tranquilizers).  · Having family members with the condition.  What are the signs or symptoms?  · Trouble staying asleep.  · Being sleepy or tired during the day.  · Getting angry a lot.  · Loud snoring.  · Headaches in the morning.  · Not being able to focus your mind (concentrate).  · Forgetting things.  · Less interest in sex.  · Mood swings.  · Personality changes.  · Feelings of sadness (depression).  · Waking up a lot during the night to pee (urinate).  · Dry mouth.  · Sore throat.  How is this diagnosed?  · Your medical history.  · A physical exam.  · A test that is done when you are sleeping (sleep study). The test is most often done in a sleep lab but may also be done at home.  How is this treated?    · Sleeping on your side.  · Using a medicine to get rid of mucus in your nose (decongestant).  · Avoiding the use of alcohol,  medicines to help you relax, or certain pain medicines (narcotics).  · Losing weight, if needed.  · Changing your diet.  · Not smoking.  · Using a machine to open your airway while you sleep, such as:  ? An oral appliance. This is a mouthpiece that shifts your lower jaw forward.  ? A CPAP device. This device blows air through a mask when you breathe out (exhale).  ? An EPAP device. This has valves that you put in each nostril.  ? A BPAP device. This device blows air through a mask when you breathe in (inhale) and breathe out.  · Having surgery if other treatments do not work.  It is important to get treatment for sleep apnea. Without treatment, it can lead to:  · High blood pressure.  · Coronary artery disease.  · In men, not being able to have an erection (impotence).  · Reduced thinking ability.  Follow these instructions at home:  Lifestyle  · Make changes that your doctor recommends.  · Eat a healthy diet.  · Lose weight if needed.  · Avoid alcohol, medicines to help you relax, and some pain medicines.  · Do not use any products that contain nicotine or tobacco, such as cigarettes, e-cigarettes, and chewing tobacco. If you need help quitting, ask your doctor.  General instructions  · Take over-the-counter and prescription medicines only as told by your doctor.  · If you were given a machine to use while you sleep, use it only as told by your doctor.  · If you are having surgery, make sure to tell your doctor you have sleep apnea. You may need to bring your device with you.  · Keep all follow-up visits as told by your doctor. This is important.  Contact a doctor if:  · The machine that you were given to use during sleep bothers you or does not seem to be working.  · You do not get better.  · You get worse.  Get help right away if:  · Your chest hurts.  · You have trouble breathing in enough air.  · You have an uncomfortable feeling in your back, arms, or stomach.  · You have trouble talking.  · One side of your  body feels weak.  · A part of your face is hanging down.  These symptoms may be an emergency. Do not wait to see if the symptoms will go away. Get medical help right away. Call your local emergency services (911 in the [place] Do not drive yourself to the hospital.  Summary  · This condition affects breathing during sleep.  · The most common cause is a collapsed or blocked airway.  · The goal of treatment is to help you breathe normally while you sleep.  This information is not intended to replace advice given to you by your health care provider. Make sure you discuss any questions you have with your health care provider.  Document Released: 09/26/2009 Document Revised: 08/13/2019 Document Reviewed: 08/13/2019  [employer] Interactive Patient Education © 2019 [employer]     Home

## 2023-11-19 LAB
HBA1C MFR BLD HPLC: 6.5 %
LDL CHOLESTEROL, EXTERNAL: 116